# Patient Record
Sex: FEMALE | Race: WHITE | NOT HISPANIC OR LATINO | Employment: FULL TIME | URBAN - METROPOLITAN AREA
[De-identification: names, ages, dates, MRNs, and addresses within clinical notes are randomized per-mention and may not be internally consistent; named-entity substitution may affect disease eponyms.]

---
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
## 2018-05-30 ENCOUNTER — RESULTS ONLY (OUTPATIENT)
Dept: OTHER | Facility: CLINIC | Age: 18
End: 2018-05-30

## 2018-05-30 ENCOUNTER — OFFICE VISIT (OUTPATIENT)
Dept: RHEUMATOLOGY | Facility: CLINIC | Age: 18
End: 2018-05-30
Attending: PEDIATRICS
Payer: COMMERCIAL

## 2018-05-30 VITALS
SYSTOLIC BLOOD PRESSURE: 124 MMHG | HEIGHT: 66 IN | DIASTOLIC BLOOD PRESSURE: 92 MMHG | BODY MASS INDEX: 22.36 KG/M2 | TEMPERATURE: 98.3 F | WEIGHT: 139.11 LBS | HEART RATE: 69 BPM

## 2018-05-30 DIAGNOSIS — M35.1 MCTD (MIXED CONNECTIVE TISSUE DISEASE) (H): Primary | ICD-10-CM

## 2018-05-30 LAB
ALBUMIN SERPL-MCNC: 3.5 G/DL (ref 3.4–5)
ALBUMIN UR-MCNC: NEGATIVE MG/DL
ALP SERPL-CCNC: 45 U/L (ref 40–150)
ALT SERPL W P-5'-P-CCNC: 15 U/L (ref 0–50)
APPEARANCE UR: CLEAR
AST SERPL W P-5'-P-CCNC: 21 U/L (ref 0–35)
BACTERIA #/AREA URNS HPF: ABNORMAL /HPF
BASOPHILS # BLD AUTO: 0 10E9/L (ref 0–0.2)
BASOPHILS NFR BLD AUTO: 0.2 %
BILIRUB DIRECT SERPL-MCNC: <0.1 MG/DL (ref 0–0.2)
BILIRUB SERPL-MCNC: 0.4 MG/DL (ref 0.2–1.3)
BILIRUB UR QL STRIP: NEGATIVE
COLOR UR AUTO: YELLOW
CREAT SERPL-MCNC: 0.73 MG/DL (ref 0.5–1)
CRP SERPL-MCNC: <2.9 MG/L (ref 0–8)
DIFFERENTIAL METHOD BLD: NORMAL
EOSINOPHIL # BLD AUTO: 0.1 10E9/L (ref 0–0.7)
EOSINOPHIL NFR BLD AUTO: 0.9 %
ERYTHROCYTE [DISTWIDTH] IN BLOOD BY AUTOMATED COUNT: 12.9 % (ref 10–15)
ERYTHROCYTE [SEDIMENTATION RATE] IN BLOOD BY WESTERGREN METHOD: 7 MM/H (ref 0–20)
GFR SERPL CREATININE-BSD FRML MDRD: >90 ML/MIN/1.7M2
GLUCOSE SERPL-MCNC: 88 MG/DL (ref 70–99)
GLUCOSE UR STRIP-MCNC: NEGATIVE MG/DL
HBA1C MFR BLD: 5.3 % (ref 0–5.6)
HCT VFR BLD AUTO: 39 % (ref 35–47)
HGB BLD-MCNC: 12.8 G/DL (ref 11.7–15.7)
HGB UR QL STRIP: ABNORMAL
IMM GRANULOCYTES # BLD: 0 10E9/L (ref 0–0.4)
IMM GRANULOCYTES NFR BLD: 0.1 %
KETONES UR STRIP-MCNC: NEGATIVE MG/DL
LEUKOCYTE ESTERASE UR QL STRIP: NEGATIVE
LYMPHOCYTES # BLD AUTO: 2.2 10E9/L (ref 0.8–5.3)
LYMPHOCYTES NFR BLD AUTO: 26.8 %
MCH RBC QN AUTO: 27.9 PG (ref 26.5–33)
MCHC RBC AUTO-ENTMCNC: 32.8 G/DL (ref 31.5–36.5)
MCV RBC AUTO: 85 FL (ref 78–100)
MONOCYTES # BLD AUTO: 0.4 10E9/L (ref 0–1.3)
MONOCYTES NFR BLD AUTO: 4.6 %
MUCOUS THREADS #/AREA URNS LPF: PRESENT /LPF
NEUTROPHILS # BLD AUTO: 5.4 10E9/L (ref 1.6–8.3)
NEUTROPHILS NFR BLD AUTO: 67.4 %
NITRATE UR QL: NEGATIVE
NRBC # BLD AUTO: 0 10*3/UL
NRBC BLD AUTO-RTO: 0 /100
PH UR STRIP: 5.5 PH (ref 5–7)
PLATELET # BLD AUTO: 291 10E9/L (ref 150–450)
PROT SERPL-MCNC: 7.1 G/DL (ref 6.8–8.8)
RBC # BLD AUTO: 4.58 10E12/L (ref 3.8–5.2)
RBC #/AREA URNS AUTO: 4 /HPF (ref 0–2)
SOURCE: ABNORMAL
SP GR UR STRIP: 1.02 (ref 1–1.03)
SQUAMOUS #/AREA URNS AUTO: 1 /HPF (ref 0–1)
TSH SERPL DL<=0.005 MIU/L-ACNC: 1.24 MU/L (ref 0.4–4)
UROBILINOGEN UR STRIP-MCNC: NORMAL MG/DL (ref 0–2)
WBC # BLD AUTO: 8 10E9/L (ref 4–11)
WBC #/AREA URNS AUTO: 1 /HPF (ref 0–5)

## 2018-05-30 PROCEDURE — 85652 RBC SED RATE AUTOMATED: CPT | Performed by: PEDIATRICS

## 2018-05-30 PROCEDURE — 86235 NUCLEAR ANTIGEN ANTIBODY: CPT | Performed by: PEDIATRICS

## 2018-05-30 PROCEDURE — 81375 HLA II TYPING AG EQUIV LR: CPT | Performed by: PEDIATRICS

## 2018-05-30 PROCEDURE — 86039 ANTINUCLEAR ANTIBODIES (ANA): CPT | Performed by: PEDIATRICS

## 2018-05-30 PROCEDURE — 82947 ASSAY GLUCOSE BLOOD QUANT: CPT | Performed by: PEDIATRICS

## 2018-05-30 PROCEDURE — 86140 C-REACTIVE PROTEIN: CPT | Performed by: PEDIATRICS

## 2018-05-30 PROCEDURE — 80076 HEPATIC FUNCTION PANEL: CPT | Performed by: PEDIATRICS

## 2018-05-30 PROCEDURE — 86800 THYROGLOBULIN ANTIBODY: CPT | Performed by: PEDIATRICS

## 2018-05-30 PROCEDURE — 81376 HLA II TYPING 1 LOCUS LR: CPT | Mod: XU | Performed by: PEDIATRICS

## 2018-05-30 PROCEDURE — 86160 COMPLEMENT ANTIGEN: CPT | Performed by: PEDIATRICS

## 2018-05-30 PROCEDURE — 86038 ANTINUCLEAR ANTIBODIES: CPT | Performed by: PEDIATRICS

## 2018-05-30 PROCEDURE — 86225 DNA ANTIBODY NATIVE: CPT | Performed by: PEDIATRICS

## 2018-05-30 PROCEDURE — 82565 ASSAY OF CREATININE: CPT | Performed by: PEDIATRICS

## 2018-05-30 PROCEDURE — 86376 MICROSOMAL ANTIBODY EACH: CPT | Performed by: PEDIATRICS

## 2018-05-30 PROCEDURE — 81001 URINALYSIS AUTO W/SCOPE: CPT | Performed by: PEDIATRICS

## 2018-05-30 PROCEDURE — 85025 COMPLETE CBC W/AUTO DIFF WBC: CPT | Performed by: PEDIATRICS

## 2018-05-30 PROCEDURE — G0463 HOSPITAL OUTPT CLINIC VISIT: HCPCS | Mod: ZF

## 2018-05-30 PROCEDURE — 36415 COLL VENOUS BLD VENIPUNCTURE: CPT | Performed by: PEDIATRICS

## 2018-05-30 PROCEDURE — 84443 ASSAY THYROID STIM HORMONE: CPT | Performed by: PEDIATRICS

## 2018-05-30 PROCEDURE — 83036 HEMOGLOBIN GLYCOSYLATED A1C: CPT | Performed by: PEDIATRICS

## 2018-05-30 PROCEDURE — 82784 ASSAY IGA/IGD/IGG/IGM EACH: CPT | Performed by: PEDIATRICS

## 2018-05-30 ASSESSMENT — PAIN SCALES - GENERAL: PAINLEVEL: NO PAIN (0)

## 2018-05-30 NOTE — LETTER
5/30/2018      RE: Meg Shepherd  [address] [facility] [address]           Problem list:     Patient Active Problem List    Diagnosis Date Noted     Superior mesenteric artery syndrome (H) 12/23/2015     MCTD (mixed connective tissue disease) (H) 04/15/2015          Allergies:     No Known Allergies          Medications:     As of completion of this visit:  Current Outpatient Prescriptions   Medication Sig Dispense Refill     clindamycin (CLINDAMAX) 1 % gel Apply topically daily       Tazarotene 0.05 % CREA        Ferrous Sulfate (IRON SUPPLEMENT PO)              Subjective:     Meg is a 17 year old female who was seen in Pediatric Rheumatology clinic today for follow up.  Meg was last seen in our clinic on 12/27/2017 and returns today accompanied by her mother.  The encounter diagnosis was MCTD (mixed connective tissue disease) (H).      Meg went off her hydroxychloroquine as planned.  About 1 month later while on a vacation she developed extensive rash on her upper chest and arms in a photosensitive distribution.  It was not terribly itchy but it was uncomfortable.  It resolved without any major intervention.  She then had about 3 more illnesses over the course of the semester and she really had a hard time recovering from them, not handling the way other people around her with similar illnesses did.  The good news is however that her long-standing difficulty with warts, for which she has had numerous treatments, have completely resolved.    She recently had some difficulty with frequent urination, without necessarily excessive thirst, and without urinary symptoms.  She does not have a history of diabetes.  She has not had other symptoms that suggest evolving autoimmunity, such as GI symptoms or temperature instability to suggest autoimmune thyroid disease.  Comprehensive Review of Systems is otherwise negative.    The past semester went well.  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    Information per our standardized questionnaire is as below:  Last Exam  Last Eye Exam: 08/06/17  Last Radiograph : 03/12/15  Self Report  Patient Pain Status: No Pain   Patient Global Assessment Of Disease Activity: Very Good  Score Reported By: Self  Arthritis History  Morning stiffness in the past week: None  Has your arthritis stopped from trying any athletic or rigorous activities, or interfaced with your ability to do these activities: No  Have you been limited your ability to do normal daily activities in the past week: No  Did you needed help from other people to do normal activities in the past week: No  Have you used any aids or devices to help you do normal daily activities in the past week: No  Important Medical Events  Hospitalized Since Last Visit: No         Examination:     Blood pressure (!) 124/92, pulse 69, temperature 98.3  F (36.8  C), temperature source Oral, height 5' 5.51\" (166.4 cm), weight 139 lb 1.8 oz (63.1 kg).  Meg appears generally well and in good spirits.  Head: Normal head and hair.  Eyes: No scleral injection, pupils normal.  Ears: Normal external structures, tympanic membranes.  Nose: No cartilage deformity, congestion.  Mouth: Normal teeth, gums, tongue, mucosa.  Throat: Normal, without erythema or exudate.  Neck: Normal, without thyromegaly  Nodes: No cervical, supraclavicular, axillary, inguinal adenopathy.  Lungs: Normal effort, clear to ausculation.  Heart: Regular rate and rhythm, S1 and S2, no murmurs; normal peripheral pulses and perfusion.  Abdomen: Soft, non-tender, without hepatomegaly, splenomegaly, or masses.  Skin: No inflammatory lesions, only normal scratches and bruises.  Nails: No pitting, infection.  Neurological: Alert, appropriately interactive, normal cranial nerves, no deficits, normal gait.  Musculoskeletal: No evidence of current synovitis of the cervical spine, TMJ, sternoclavicular, " acromioclavicular, glenohumeral, elbow, wrist, finger, lumbar spine, sacroiliac, hip, knee, ankle, or toe joints. No tendonitis or bursitis. No enthesitis.          Assessment:     History of mixed connective tissue disease, with elevated RNP antibody, serum IgG, and borderline low complement values.  She recently had difficulty with a photosensitive eruption but whether this is related to an underlying rheumatic condition or might represent a separate problem such as polymorphous light eruption is currently unclear.  She is done remarkably well off hydroxychloroquine.  I cannot explain why she would have more trouble with infectious illnesses and have improvement in her warts while going off hydroxychloroquine.  We do know that individuals with rheumatic diseases have impaired immune function, and that they actually do get more infections, but in her case she has had improvement in handling a viral process (warts) which might suggest better immune function, and I cannot reconcile that with the other illnesses she is experienced.    We reviewed that individuals prone to rheumatic diseases are also prone to autoimmune thyroid disease, type 1 diabetes and celiac disease and there is is evolving evidence that all individuals should have baseline screening for this.  In the case of celiac disease the preferred screening for these patients is actually looking for genetic risk and then if the testing is negative not pursuing any serologic testing.  Change Since Last Visit: Same  ACR Functional Class: Normal  Provider Global Assessment Of Disease Activity: Inactive (0)  (This is measured on the scale of 0 - 10)  On Medication For Treatment Of PUJA?: No  Normal ESR: Normal, or abnormality not due to PUJA  Normal CRP: Normal, or abnormality not due to PUJA  GALA Status: Positive  Rheumatoid Factor Status: Negative     In Compliance With Screening Interval For PUJA: Yes         Plan:     1. Laboratory monitoring today to follow up  on the above concerns.  2. No imaging is needed today.  3. She probably does not need follow-up eye exams at this point if she stays off the hydroxychloroquine, other than routine care like other adults.  4. Follow-up in 12 months for a final check if things are seemingly going well.  If there are any concerns about today's labs or new problems develop we may need to see her sooner.  She has a recurrence of a photosensitive eruption she may need to see dermatology.     If there are any new questions or concerns, I would be glad to help and can be reached through our main office at [phone] or our paging  at [phone].    Jacob Duque MD         Addendum:  Laboratory Investigations:     Results for orders placed or performed in visit on 05/30/18 (from the past 48 hour(s))   Routine UA with microscopic   Result Value Ref Range    Color Urine Yellow     Appearance Urine Clear     Glucose Urine Negative NEG^Negative mg/dL    Bilirubin Urine Negative NEG^Negative    Ketones Urine Negative NEG^Negative mg/dL    Specific Gravity Urine 1.023 1.003 - 1.035    Blood Urine Trace (A) NEG^Negative    pH Urine 5.5 5.0 - 7.0 pH    Protein Albumin Urine Negative NEG^Negative mg/dL    Urobilinogen mg/dL Normal 0.0 - 2.0 mg/dL    Nitrite Urine Negative NEG^Negative    Leukocyte Esterase Urine Negative NEG^Negative    Source Midstream Urine     WBC Urine 1 0 - 5 /HPF    RBC Urine 4 (H) 0 - 2 /HPF    Bacteria Urine Few (A) NEG^Negative /HPF    Squamous Epithelial /HPF Urine 1 0 - 1 /HPF    Mucous Urine Present (A) NEG^Negative /LPF   CBC with platelets differential   Result Value Ref Range    WBC 8.0 4.0 - 11.0 10e9/L    RBC Count 4.58 3.8 - 5.2 10e12/L    Hemoglobin 12.8 11.7 - 15.7 g/dL    Hematocrit 39.0 35.0 - 47.0 %    MCV 85 78 - 100 fl    MCH 27.9 26.5 - 33.0 pg    MCHC 32.8 31.5 - 36.5 g/dL    RDW 12.9 10.0 - 15.0 %    Platelet Count 291 150 - 450 10e9/L    Diff Method Automated Method     % Neutrophils 67.4  %    % Lymphocytes 26.8 %    % Monocytes 4.6 %    % Eosinophils 0.9 %    % Basophils 0.2 %    % Immature Granulocytes 0.1 %    Nucleated RBCs 0 0 /100    Absolute Neutrophil 5.4 1.6 - 8.3 10e9/L    Absolute Lymphocytes 2.2 0.8 - 5.3 10e9/L    Absolute Monocytes 0.4 0.0 - 1.3 10e9/L    Absolute Eosinophils 0.1 0.0 - 0.7 10e9/L    Absolute Basophils 0.0 0.0 - 0.2 10e9/L    Abs Immature Granulocytes 0.0 0 - 0.4 10e9/L    Absolute Nucleated RBC 0.0    Creatinine   Result Value Ref Range    Creatinine 0.73 0.50 - 1.00 mg/dL    GFR Estimate >90 >60 mL/min/1.7m2    GFR Estimate If Black >90 >60 mL/min/1.7m2   CRP inflammation   Result Value Ref Range    CRP Inflammation <2.9 0.0 - 8.0 mg/L   RBC  Sed Rate   Result Value Ref Range    Sed Rate 7 0 - 20 mm/h   Hepatic panel   Result Value Ref Range    Bilirubin Direct <0.1 0.0 - 0.2 mg/dL    Bilirubin Total 0.4 0.2 - 1.3 mg/dL    Albumin 3.5 3.4 - 5.0 g/dL    Protein Total 7.1 6.8 - 8.8 g/dL    Alkaline Phosphatase 45 40 - 150 U/L    ALT 15 0 - 50 U/L    AST 21 0 - 35 U/L   HLA DQB1 for Celiac Disease   Result Value Ref Range    HLA DQB1 for Celiac Disease       Specimen received - Immunology report to follow upon completion.   TSH with free T4 reflex   Result Value Ref Range    TSH 1.24 0.40 - 4.00 mU/L   Hemoglobin A1c   Result Value Ref Range    Hemoglobin A1C 5.3 0 - 5.6 %   Glucose   Result Value Ref Range    Glucose 88 70 - 99 mg/dL   These preliminary results are reassuring.          Jacob Duque MD    CC  Patient Care Team:  Sabine Inman MD as PCP - General  Jacob Hernández MD as MD (Ophthalmology)    Copy to patient  Maribel Shepherd MARC  [address] [facility] [id number]

## 2018-05-30 NOTE — NURSING NOTE
"Chief Complaint   Patient presents with     Follow Up For     Mixed connective tissue disease   BP (!) 124/92  Pulse 69  Temp 98.3  F (36.8  C) (Oral)  Ht 5' 5.51\" (166.4 cm)  Wt 139 lb 1.8 oz (63.1 kg)  BMI 22.79 kg/m2    PT. DECLINED LMX    Olga Garcia CMA    "

## 2018-05-30 NOTE — PATIENT INSTRUCTIONS
[facility] Pediatric Rheumatology    For Help:  The Pediatric Call Center at [phone] can help with scheduling of routine follow up visits.  Alejandra Jason and Mela Van are the Nurse Coordinators for the Division of Pediatric Rheumatology and can be reached directly at [phone]. They can help with questions about your child s rheumatic condition, medications, and test results.   Please try to schedule infusions 3 months in advance.  Please try to give us 72 hours or longer notice if you need to cancel infusions so other patients can benefit from this opening).  Note: Insurance authorization must be obtained before any infusion can be scheduled. If you change health insurance, you must notify our office as soon as possible, so that the infusion can be reauthorized.    For emergencies after hours or on the weekends, please call the page  at [phone] and ask to speak to the physician on-call for Pediatric Rheumatology. Please do not use [employer] for urgent requests.  Main  Services:  [phone]  o Hmong/Jamaican/Pashto: [phone]  o Australian: [phone]  o Lithuanian: [phone]

## 2018-05-30 NOTE — PROGRESS NOTES
Problem list:     Patient Active Problem List    Diagnosis Date Noted     Superior mesenteric artery syndrome (H) 12/23/2015     MCTD (mixed connective tissue disease) (H) 04/15/2015          Allergies:     No Known Allergies          Medications:     As of completion of this visit:  Current Outpatient Prescriptions   Medication Sig Dispense Refill     clindamycin (CLINDAMAX) 1 % gel Apply topically daily       Tazarotene 0.05 % CREA        Ferrous Sulfate (IRON SUPPLEMENT PO)              Subjective:     Meg is a 17 year old female who was seen in Pediatric Rheumatology clinic today for follow up.  Meg was last seen in our clinic on 12/27/2017 and returns today accompanied by her mother.  The encounter diagnosis was MCTD (mixed connective tissue disease) (H).      Meg went off her hydroxychloroquine as planned.  About 1 month later while on a vacation she developed extensive rash on her upper chest and arms in a photosensitive distribution.  It was not terribly itchy but it was uncomfortable.  It resolved without any major intervention.  She then had about 3 more illnesses over the course of the semester and she really had a hard time recovering from them, not handling the way other people around her with similar illnesses did.  The good news is however that her long-standing difficulty with warts, for which she has had numerous treatments, have completely resolved.    She recently had some difficulty with frequent urination, without necessarily excessive thirst, and without urinary symptoms.  She does not have a history of diabetes.  She has not had other symptoms that suggest evolving autoimmunity, such as GI symptoms or temperature instability to suggest autoimmune thyroid disease.  Comprehensive Review of Systems is otherwise negative.    The past semester went well.  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    Information per our standardized  "questionnaire is as below:  Last Exam  Last Eye Exam: 08/06/17  Last Radiograph : 03/12/15  Self Report  Patient Pain Status: No Pain   Patient Global Assessment Of Disease Activity: Very Good  Score Reported By: Self  Arthritis History  Morning stiffness in the past week: None  Has your arthritis stopped from trying any athletic or rigorous activities, or interfaced with your ability to do these activities: No  Have you been limited your ability to do normal daily activities in the past week: No  Did you needed help from other people to do normal activities in the past week: No  Have you used any aids or devices to help you do normal daily activities in the past week: No  Important Medical Events  Hospitalized Since Last Visit: No         Examination:     Blood pressure (!) 124/92, pulse 69, temperature 98.3  F (36.8  C), temperature source Oral, height 5' 5.51\" (166.4 cm), weight 139 lb 1.8 oz (63.1 kg).  Meg appears generally well and in good spirits.  Head: Normal head and hair.  Eyes: No scleral injection, pupils normal.  Ears: Normal external structures, tympanic membranes.  Nose: No cartilage deformity, congestion.  Mouth: Normal teeth, gums, tongue, mucosa.  Throat: Normal, without erythema or exudate.  Neck: Normal, without thyromegaly  Nodes: No cervical, supraclavicular, axillary, inguinal adenopathy.  Lungs: Normal effort, clear to ausculation.  Heart: Regular rate and rhythm, S1 and S2, no murmurs; normal peripheral pulses and perfusion.  Abdomen: Soft, non-tender, without hepatomegaly, splenomegaly, or masses.  Skin: No inflammatory lesions, only normal scratches and bruises.  Nails: No pitting, infection.  Neurological: Alert, appropriately interactive, normal cranial nerves, no deficits, normal gait.  Musculoskeletal: No evidence of current synovitis of the cervical spine, TMJ, sternoclavicular, acromioclavicular, glenohumeral, elbow, wrist, finger, lumbar spine, sacroiliac, hip, knee, ankle, or " toe joints. No tendonitis or bursitis. No enthesitis.          Assessment:     History of mixed connective tissue disease, with elevated RNP antibody, serum IgG, and borderline low complement values.  She recently had difficulty with a photosensitive eruption but whether this is related to an underlying rheumatic condition or might represent a separate problem such as polymorphous light eruption is currently unclear.  She is done remarkably well off hydroxychloroquine.  I cannot explain why she would have more trouble with infectious illnesses and have improvement in her warts while going off hydroxychloroquine.  We do know that individuals with rheumatic diseases have impaired immune function, and that they actually do get more infections, but in her case she has had improvement in handling a viral process (warts) which might suggest better immune function, and I cannot reconcile that with the other illnesses she is experienced.    We reviewed that individuals prone to rheumatic diseases are also prone to autoimmune thyroid disease, type 1 diabetes and celiac disease and there is is evolving evidence that all individuals should have baseline screening for this.  In the case of celiac disease the preferred screening for these patients is actually looking for genetic risk and then if the testing is negative not pursuing any serologic testing.  Change Since Last Visit: Same  ACR Functional Class: Normal  Provider Global Assessment Of Disease Activity: Inactive (0)  (This is measured on the scale of 0 - 10)  On Medication For Treatment Of PUJA?: No  Normal ESR: Normal, or abnormality not due to PUJA  Normal CRP: Normal, or abnormality not due to PUJA  GALA Status: Positive  Rheumatoid Factor Status: Negative     In Compliance With Screening Interval For PUJA: Yes         Plan:     1. Laboratory monitoring today to follow up on the above concerns.  2. No imaging is needed today.  3. She probably does not need follow-up eye  exams at this point if she stays off the hydroxychloroquine, other than routine care like other adults.  4. Follow-up in 12 months for a final check if things are seemingly going well.  If there are any concerns about today's labs or new problems develop we may need to see her sooner.  She has a recurrence of a photosensitive eruption she may need to see dermatology.     If there are any new questions or concerns, I would be glad to help and can be reached through our main office at [phone] or our paging  at [phone].    Jacob Duque MD         Addendum:  Laboratory Investigations:     Results for orders placed or performed in visit on 05/30/18 (from the past 48 hour(s))   Routine UA with microscopic   Result Value Ref Range    Color Urine Yellow     Appearance Urine Clear     Glucose Urine Negative NEG^Negative mg/dL    Bilirubin Urine Negative NEG^Negative    Ketones Urine Negative NEG^Negative mg/dL    Specific Gravity Urine 1.023 1.003 - 1.035    Blood Urine Trace (A) NEG^Negative    pH Urine 5.5 5.0 - 7.0 pH    Protein Albumin Urine Negative NEG^Negative mg/dL    Urobilinogen mg/dL Normal 0.0 - 2.0 mg/dL    Nitrite Urine Negative NEG^Negative    Leukocyte Esterase Urine Negative NEG^Negative    Source Midstream Urine     WBC Urine 1 0 - 5 /HPF    RBC Urine 4 (H) 0 - 2 /HPF    Bacteria Urine Few (A) NEG^Negative /HPF    Squamous Epithelial /HPF Urine 1 0 - 1 /HPF    Mucous Urine Present (A) NEG^Negative /LPF   CBC with platelets differential   Result Value Ref Range    WBC 8.0 4.0 - 11.0 10e9/L    RBC Count 4.58 3.8 - 5.2 10e12/L    Hemoglobin 12.8 11.7 - 15.7 g/dL    Hematocrit 39.0 35.0 - 47.0 %    MCV 85 78 - 100 fl    MCH 27.9 26.5 - 33.0 pg    MCHC 32.8 31.5 - 36.5 g/dL    RDW 12.9 10.0 - 15.0 %    Platelet Count 291 150 - 450 10e9/L    Diff Method Automated Method     % Neutrophils 67.4 %    % Lymphocytes 26.8 %    % Monocytes 4.6 %    % Eosinophils 0.9 %    % Basophils 0.2 %    %  Immature Granulocytes 0.1 %    Nucleated RBCs 0 0 /100    Absolute Neutrophil 5.4 1.6 - 8.3 10e9/L    Absolute Lymphocytes 2.2 0.8 - 5.3 10e9/L    Absolute Monocytes 0.4 0.0 - 1.3 10e9/L    Absolute Eosinophils 0.1 0.0 - 0.7 10e9/L    Absolute Basophils 0.0 0.0 - 0.2 10e9/L    Abs Immature Granulocytes 0.0 0 - 0.4 10e9/L    Absolute Nucleated RBC 0.0    Creatinine   Result Value Ref Range    Creatinine 0.73 0.50 - 1.00 mg/dL    GFR Estimate >90 >60 mL/min/1.7m2    GFR Estimate If Black >90 >60 mL/min/1.7m2   CRP inflammation   Result Value Ref Range    CRP Inflammation <2.9 0.0 - 8.0 mg/L   RBC  Sed Rate   Result Value Ref Range    Sed Rate 7 0 - 20 mm/h   Hepatic panel   Result Value Ref Range    Bilirubin Direct <0.1 0.0 - 0.2 mg/dL    Bilirubin Total 0.4 0.2 - 1.3 mg/dL    Albumin 3.5 3.4 - 5.0 g/dL    Protein Total 7.1 6.8 - 8.8 g/dL    Alkaline Phosphatase 45 40 - 150 U/L    ALT 15 0 - 50 U/L    AST 21 0 - 35 U/L   HLA DQB1 for Celiac Disease   Result Value Ref Range    HLA DQB1 for Celiac Disease       Specimen received - Immunology report to follow upon completion.   TSH with free T4 reflex   Result Value Ref Range    TSH 1.24 0.40 - 4.00 mU/L   Hemoglobin A1c   Result Value Ref Range    Hemoglobin A1C 5.3 0 - 5.6 %   Glucose   Result Value Ref Range    Glucose 88 70 - 99 mg/dL   These preliminary results are reassuring.              CC  Patient Care Team:  Sabine Dickson MD as PCP - General  Jacob Hernández MD as MD (Ophthalmology)  Jacob Duque MD as MD (Pediatrics)  Sabine Dickson MD as Referring Physician (Pediatrics)  SABINE DICKSON    Copy to patient  Maribel Shepherd MARC  [address] [facility] [id number]

## 2018-05-30 NOTE — MR AVS SNAPSHOT
After Visit Summary   5/30/2018    Meg Shepherd    MRN: [id number]           Patient Information     Date Of Birth          2000        Visit Information        Provider Department      5/30/2018 3:40 PM Jacob Duque MD Peds Rheumatology        Today's Diagnoses     MCTD (mixed connective tissue disease) (H)    -  1      Care Instructions      [facility]    For Help:  The Pediatric Call Center at [phone] can help with scheduling of routine follow up visits.  Alejandra Jason and Mela Van are the Nurse Coordinators for the Division of Pediatric Rheumatology and can be reached directly at [phone]. They can help with questions about your child s rheumatic condition, medications, and test results.   Please try to schedule infusions 3 months in advance.  Please try to give us 72 hours or longer notice if you need to cancel infusions so other patients can benefit from this opening).  Note: Insurance authorization must be obtained before any infusion can be scheduled. If you change health insurance, you must notify our office as soon as possible, so that the infusion can be reauthorized.    For emergencies after hours or on the weekends, please call the page  at [phone] and ask to speak to the physician on-call for Pediatric Rheumatology. Please do not use [facility] for urgent requests.  Main  Services:  [phone]  o Hmong/Libyan/Azeri: [phone]  o Tanzanian: [phone]  o Hungarian: [phone]            Follow-ups after your visit        Follow-up notes from your care team     Return in about 1 year (around 5/30/2019) for Routine Visit.      Who to contact     Please call your clinic at [phone] to:    Ask questions about your health    Make or cancel appointments    Discuss your medicines    Learn about your test results    Speak to your doctor            Additional Information About Your Visit        [employer]acet  "Information     Xl[employer]ena is an electronic gateway that provides easy, online access to your medical records. With X[employer]umena, you can request a clinic appointment, read your test results, renew a prescription or communicate with your care team.     To sign up for [employer]ena visit the website at [email or web address]   You will be asked to enter the access code listed below, as well as some personal information. Please follow the directions to create your username and password.     Your access code is: [email or web address]  Expires: 2018  4:13 PM     Your access code will  in 90 days. If you need help or a new code, please contact your [facility] or call [phone] for assistance.        Care EveryWhere ID     This is your Care EveryWhere ID. This could be used by other organizations to access your [place] medical records  [id number]        Your Vitals Were     Pulse Temperature Height BMI (Body Mass Index)          69 98.3  F (36.8  C) (Oral) 5' 5.51\" (166.4 cm) 22.79 kg/m2         Blood Pressure from Last 3 Encounters:   18 (!) 124/92   17 115/82   17 110/79    Weight from Last 3 Encounters:   18 139 lb 1.8 oz (63.1 kg) (68 %)*   17 139 lb 5.3 oz (63.2 kg) (69 %)*   17 136 lb 14.5 oz (62.1 kg) (68 %)*     * Growth percentiles are based on CDC 2-20 Years data.              We Performed the Following     Anti Nuclear Charo IgG by IFA with Reflex     Anti thyroglobulin antibody     CBC with platelets differential     Complement C3     Complement C4     Creatinine     CRP inflammation     DNA double stranded antibodies     HORACIO antibody panel     Glucose     Hemoglobin A1c     Hepatic panel     HLA DQB1 for Celiac Disease     IgG     RBC  Sed Rate     Routine UA with microscopic     Thyroid peroxidase antibody     TSH with free T4 reflex        Primary Care Provider Office Phone # Fax #    Sabine Inman [phone]       [facility] [address]  [place] [address]        Equal Access to Services     EMMYRADHIKA RAMSEY : Hadii aad ku hadritasheila Somiguel, wacatherineda santosadaha, qaybta kavithajustynaalba york, bradley kimyungwhit briones. So [facility] [phone].    ATENCIÓN: Si habla español, tiene a pacheco disposición servicios gratuitos de asistencia lingüística. Llame al [phone].    We comply with applicable federal civil rights laws and [place] laws. We do not discriminate on the basis of race, color, national origin, age, disability, sex, sexual orientation, or gender identity.            Thank you!     Thank you for choosing PEDS RHEUMATOLOGY  for your care. Our goal is always to provide you with excellent care. Hearing back from our patients is one way we can continue to improve our services. Please take a few minutes to complete the written survey that you may receive in the mail after your visit with us. Thank you!             Your Updated Medication List - Protect others around you: Learn how to safely use, store and throw away your medicines at www.disposemymeds.org.          This list is accurate as of 5/30/18  4:13 PM.  Always use your most recent med list.                   Brand Name Dispense Instructions for use Diagnosis    clindamycin 1 % topical gel    CLINDAMAX     Apply topically daily        IRON SUPPLEMENT PO           tazarotene 0.05 % Crea cream

## 2018-05-31 LAB
ANA PAT SER IF-IMP: ABNORMAL
ANA SER QL IF: POSITIVE
ANA TITR SER IF: ABNORMAL {TITER}
C3 SERPL-MCNC: 82 MG/DL (ref 76–169)
C4 SERPL-MCNC: 14 MG/DL (ref 15–50)
DSDNA AB SER-ACNC: 1 IU/ML
ENA RNP IGG SER IA-ACNC: <0.2 AI (ref 0–0.9)
ENA SCL70 IGG SER IA-ACNC: <0.2 AI (ref 0–0.9)
ENA SM IGG SER-ACNC: <0.2 AI (ref 0–0.9)
ENA SS-A IGG SER IA-ACNC: 0.6 AI (ref 0–0.9)
ENA SS-B IGG SER IA-ACNC: <0.2 AI (ref 0–0.9)
HLA DQB1 FOR CELIAC DISEASE: NORMAL
IGG SERPL-MCNC: 1080 MG/DL (ref 695–1620)
THYROGLOB AB SERPL IA-ACNC: <20 IU/ML (ref 0–40)
THYROPEROXIDASE AB SERPL-ACNC: 11 IU/ML

## 2018-06-04 LAB
DQA1*LOCUS NMDP: NORMAL
DQA1*LOCUS: NORMAL
DQA1*NMDP: NORMAL
DQB1* LOCUS NMDP: NORMAL
DQB1* LOCUS: NORMAL
DQB1* NMDP: NORMAL
DQB1*: NORMAL
DRSSO COMMENTS: NORMAL
DRSSO TEST METHOD: NORMAL

## 2018-10-26 ENCOUNTER — TELEPHONE (OUTPATIENT)
Dept: RHEUMATOLOGY | Facility: CLINIC | Age: 18
End: 2018-10-26

## 2018-10-26 NOTE — TELEPHONE ENCOUNTER
"Meg is at college, but Mom went and picked her up during the night and brought her home.  Meg has been complaining for about a week that her breathing \"feels weird\", she can feel her heart \"pounding\", and now her chest hurts., She says that she feels weak and her feet are falling asleep. Mom said that Meg look pale and is exhausted.  I advised her to have Meg evaluated by primary MD. She is not currently on any medications for her MCTD.  "

## 2023-09-15 ENCOUNTER — TRANSCRIBE ORDERS (OUTPATIENT)
Dept: OTHER | Age: 23
End: 2023-09-15

## 2023-09-15 DIAGNOSIS — M35.1 MCTD (MIXED CONNECTIVE TISSUE DISEASE) (H): Primary | ICD-10-CM

## 2024-02-02 ENCOUNTER — TELEPHONE (OUTPATIENT)
Dept: RHEUMATOLOGY | Facility: CLINIC | Age: 24
End: 2024-02-02
Payer: COMMERCIAL

## 2024-02-02 NOTE — TELEPHONE ENCOUNTER
LVM to reschedule appt on 6/27/24 with Dr. Villanueva. On HOLD 8/15/24 at 8am and 2pm ok to schedule per Alessandra. If this doesnt work then next avail NP appt. Thank you

## 2024-02-02 NOTE — TELEPHONE ENCOUNTER
LVM explaining there was a miscommunication about the Dr. Espinosa schedule. There is NO need for a reschedule. The appt on 6/27/24 is still good. Sent out appt letter

## 2024-06-26 NOTE — PROGRESS NOTES
Rheumatology Clinic Visit  [facility]  Bob Villanueva M.D.     Meg Shepherd MRN# [id number]   YOB: 2000 Age: 24 year old   Date of Visit: 06/27/2024  Primary care provider: Sabine Inman          Assessment and Plan:     #  Mixed connective tissue disease (onset ~2015 with joint pain, fatigue, gastrointestinal symptoms, photosensitive skin rash, foot Raynaud's, high positive GALA, positive RNP, treatment hydroxychloroquine, prednisone, and methotrexate from 2016 through 2017)  # Recurrent finger and hand rashes, query eczema  # Raynaud's phenomenon, feet    Data: In May 2018, antinuclear antibody positive at 1: 1280; complement C4 was slightly low at 14; complement C3 normal.  Double-stranded DNA undetectable.  Extractable nuclear antigen panel negative.  Thyroid peroxidase antibodies negative.  Imaging: In 2015, x-rays of the knees, pelvis and hips, and shoulders were normal.    Discussion: I agree with 's impression of mixed connective tissue disease with musculoskeletal, cutaneous, gastrointestinal symptoms associated with high positive GALA and positive RNP and hypocomplementemia.  Since the last visit with rheumatology in 2018, patient has had no recurrence of major symptoms that precipitated treatment with corticosteroids and combination hydroxychloroquine and methotrexate back in 2016.  Patient has been off immunomodulatory medication since the last visit.  She still has stable, low-grade Raynaud's without permanent tissue injury in the feet; monthly rashes over the fingers and elbows may represent eczema; these are nonprogressive and are spontaneously remitting, arguing against the notion that they are representative of ongoing connective tissue disease.  Although there are no signs or symptoms that suggest active or progressive autoimmune disease or tissue injury, we discussed that internal organs can be affected in connective tissue disease, and sometimes involvement may  not be accompanied by symptoms.  I recommend screening for renal, hepatic, and bone marrow function with blood work and urinalysis today.  I recommend rechecking complement.  If these screening tests are normal/negative, I recommend starting a annual program for routine monitoring of symptoms and organ function.  Immunomodulatory therapy is not warranted presently.    We discussed in clinic:  Diagnosis:  1.  Mixed connective tissue disease  2.  Recurrent finger and hand rashes, query eczema  3.  Raynaud's phenomenon, feet    Plan:  1.  Use hydrocortisone 1% cream on itchy rash on fingers  2.  If rash not responsive to over-the-counter hydrocortisone, recommend consultation with dermatology  3.  Protect feet as much as possible from cold insults (wearing double socks, battery-powered foot warmers, etc.)  4.  Monitor for joint pain, fatigue, weight change, change in blood pressure (rise in diastolic blood pressure greater than 20 mmHg sustained over several weeks)  5.  If no symptoms or concerns 1 month before next scheduled rheumatology follow-up, contact rheumatology to reschedule for another 6 months out  6.  Check urinalysis, kidney and liver function, immune activation, and blood counts today; screen kidney function, blood counts, urinalysis once annually.    RTC 12 mos    Bob Villanueva MD  Staff Rheumatologist, [facility]    On the day of the encounter, a total of 60 minutes was spent in chart review, and in counseling and coordination of care, regarding the patient's complex medical problem of mixed connective tissue disease, juvenile onset, Raynaud's phenomenon, recurrent pruritic hand rashes.           History of Present Illness:   Meg Shepherd presents for establishment of care for mixed connective tissue disease.    Mixed connective tissue disease, diagnosed in adolescence, with elevated RNP antibody, serum IgG, and borderline low complement values, query photosensitive rash,  started with vomitting,  "fatigue age 11. At 15, she had joint pain, fatigue. She had joint pain in hands, wrists, elbows, shoulder, knees. Initially on prednisone, then added hydroxychloroquine and methotrexate with relief. She tapered off prednisone after 3 mos, and off methotrexate and hydroxychloroquine as of 2017.    Interval history June 27, 2024    Patient was last seen by Dr. Duque in pediatric rheumatology on May 30, 2018.  Impression was of mixed connective tissue disease.  Excerpt of the history obtained at that visit is presented below:    \"...Meg went off her hydroxychloroquine as planned.  About 1 month later while on a vacation she developed extensive rash on her upper chest and arms in a photosensitive distribution.  It was not terribly itchy but it was uncomfortable.  It resolved without any major intervention.  She then had about 3 more illnesses over the course of the semester and she really had a hard time recovering from them, not handling the way other people around her with similar illnesses did.  The good news is however that her long-standing difficulty with warts, for which she has had numerous treatments, have completely resolved.     She recently had some difficulty with frequent urination, without necessarily excessive thirst, and without urinary symptoms.  She does not have a history of diabetes.  She has not had other symptoms that suggest evolving autoimmunity, such as GI symptoms or temperature instability to suggest autoimmune thyroid disease.  Comprehensive Review of Systems is otherwise negative.     The past semester went well.  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  Recommendation was to continue monitoring off hydroxychloroquine.    Today, she reports that her " "goal is to simply establish care with a rheumatologist \"in case something happens\".  She reports that as a teenager in high school, gastrointestinal, musculoskeletal, and skin symptoms were so severe that her activities were markedly limited for a time.  She has had no recurrence of such symptoms since her last visit in pediatric rheumatology, but wants to be sure that she is not missing anything with regard to screening and monitoring.    she notes recurrent rashes on the tops of her feet. Typically itchy, not sun-related.  There are colorless tiny bumps on the sides of her fingers frequently associated with these rashes.  Also rash in cresaes of elbows and on fingers. Rashes on fingers resolve on their own after a week, occurring once monthly--+ itching, with little blisters.    She notes wintertime cold induced color change and painful numbness affecting both feet, principally the toes.  She has not ever had skin breakdown or skin ulcers associated with color change/numbness.  Frequency and severity of color change phenomena has not increased in recent years.    She reports no fevers, chills, sweats, change in appetite, fatigue, joint pain, pleurisy, cough, sore throat, headache, abdominal pain, change in bowel habits, numbness weakness or tingling in arms or legs.  No early morning stiffness, no regular use of OTC medications.    For exercise, she runs/walks several times weekly. No dyspnea on exertion.  She does not perform resistance exercises.  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           Review of Systems:     Constitutional: negative  Skin: in winter, she notes color change in toes, not hands. No ulcers  Eyes: negative  Ears/Nose/Throat: negative  Respiratory: No shortness of breath, dyspnea on exertion, cough, or hemoptysis  Cardiovascular: negative  Gastrointestinal: negative  Genitourinary: " negative  Musculoskeletal: negative  Neurologic: negative  Psychiatric: negative  Hematologic/Lymphatic/Immunologic: negative  Endocrine: negative         Active Problem List:     Patient Active Problem List    Diagnosis Date Noted    Superior mesenteric artery syndrome (H24) 12/23/2015     Priority: Medium    MCTD (mixed connective tissue disease) (H24) 04/15/2015     Priority: Medium            Past Medical History:     Past Medical History:   Diagnosis Date    MCTD (mixed connective tissue disease) (H24) 4/15/2015     No past surgical history on file.         Social History:     Social History     Socioeconomic History    Marital status:      Spouse name: Not on file    Number of children: Not on file    Years of education: Not on file    Highest education level: Not on file   Occupational History    Not on file   Tobacco Use    Smoking status: Never    Smokeless tobacco: Not on file   Substance and Sexual Activity    Alcohol use: Not on file    Drug use: Not on file    Sexual activity: Not on file   Other Topics Concern    Not on file   Social History Narrative    Not on file     Social Determinants of Health     Financial Resource Strain: Low Risk  (9/13/2023)    Received from [facility] [employer] [facility], [facility] & [facility]    Financial Resource Strain     Difficulty of Paying Living Expenses: 3     Difficulty of Paying Living Expenses: Not on file   Food Insecurity: No Food Insecurity (9/13/2023)    [facility], [facility] & [facility]    Food Insecurity     Worried About Running Out of Food in the Last Year: 1   Transportation Needs: No Transportation Needs (9/13/2023)    [facility], [facility]    Transportation Needs     Lack of Transportation (Medical): 1   Physical Activity: Not on file   Stress: Not on file    Social Connections: Socially Integrated (9/13/2023)    [facility], [employer] [facility]    Social Connections     Frequency of Communication with Friends and Family: 0   Interpersonal Safety: Not on file   Housing Stability: Low Risk  (9/13/2023)    Received from [facility], [employer] [facility]    Housing Stability     Unable to Pay for Housing in the Last Year: 1   Drinks EtOH once weekly.  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       Family History:   No family history on file.    Mother has RA, thyroid autoimmunity  Cousins with hyperthyroidism  Sister has Tracy-Danlos  No SLE, MS  No psoriasis or ecxaema       Allergies:   No Known Allergies         Medications:     Current Outpatient Medications   Medication Sig Dispense Refill    escitalopram (LEXAPRO) 5 MG tablet Take 10 mg by mouth daily      norgestimate-ethinyl estradiol (ORTHO-CYCLEN) 0.25-35 MG-MCG tablet Take 1 tablet by mouth daily      clindamycin (CLINDAMAX) 1 % gel Apply topically daily (Patient not taking: Reported on 6/27/2024)      Ferrous Sulfate (IRON SUPPLEMENT PO)  (Patient not taking: Reported on 6/27/2024)      Tazarotene 0.05 % CREA  (Patient not taking: Reported on 6/27/2024)       She is not using clindamycin gel.         Physical Exam:   Blood pressure 119/86, pulse 73, weight 79.8 kg (176 lb), SpO2 99%.  Wt Readings from Last 6 Encounters:   06/27/24 79.8 kg (176 lb)   05/30/18 63.1 kg (139 lb 1.8 oz) (68%, Z= 0.46)*   12/27/17 63.2 kg (139 lb 5.3 oz) (69%, Z= 0.49)*   06/14/17 62.1 kg (136 lb 14.5 oz) (68%, Z= 0.46)*   12/22/16 61.8 kg (136 lb 3.9 oz) (69%, Z= 0.49)*   08/17/16 61.3 kg (135 lb 2.3 oz) (68%, Z= 0.48)*     * Growth percentiles are based on CDC (Girls, 2-20 Years) data.     Body mass index is 28.83 kg/m .  Constitutional: well-developed, appearing stated age;  cooperative  Eyes: nl EOM, PERRLA, vision, conjunctiva, sclera  ENT: nl external ears, nose, hearing, lips, teeth, gums, throat  No mucous membrane lesions, normal saliva pool  Neck: no mass or thyroid enlargement  Resp: Breathing is unlabored  GI: no ABD mass or tenderness  : not tested  Lymph: Firm 1 cm mobile nontender mass at the right angle of the jaw  MS: The TMJ, neck, shoulder, elbow, wrist, MCP/PIP/DIP, spine, hip, knee, ankle, and foot MTP/IP joints were examined and found normal. No active synovitis or altered joint anatomy. Full joint ROM. Normal  strength. No dactylitis,  tenosynovitis, enthesopathy.  Skin: no nail pitting, alopecia, rash, nodules or lesions  Neuro: nl cranial nerves, strength, sensation, DTRs.   Psych: nl judgement, orientation, memory, affect.         Data:     @      Latest Ref Rng & Units 12/22/2016     9:52 AM 6/14/2017     5:10 PM 5/30/2018     4:23 PM   RHEUM RESULTS   Albumin 3.4 - 5.0 g/dL 3.6  3.3  3.5    ALT 0 - 50 U/L 18  18  15    AST 0 - 35 U/L 21  12  21    GLAA interpretation NEG^Negative   Positive    GALA pattern 1    HOMOGENEOUS    GALA titer 1    >1:1,280    Complement C3 76 - 169 mg/dL 90   82    Complement C4 15 - 50 mg/dL 15   14    Creatinine 0.50 - 1.00 mg/dL 0.93  0.76  0.73    CRP Inflammation 0.0 - 8.0 mg/L <2.9  6.5  <2.9    DNA-ds <10 IU/mL 1   1    GFR Estimate If Black >60 mL/min/1.7m2 >90  African American GFR Calc    >90   GFR Calc    >90    GFR Estimate >60 mL/min/1.7m2 78  >90  Non  GFR Calc    >90    Hematocrit 35.0 - 47.0 % 43.6  39.3  39.0    Hemoglobin 11.7 - 15.7 g/dL 14.4  13.5  12.8    IGA 70 - 380 mg/dL 57       - 1,620 mg/dL 1,100  1,040  1,080    WBC 4.0 - 11.0 10e9/L 5.6  9.0  8.0    RBC Count 3.8 - 5.2 10e12/L 4.84  4.47  4.58    RDW 10.0 - 15.0 % 13.9  12.4  12.9    MCHC 31.5 - 36.5 g/dL 33.0  34.4  32.8    MCV 78 - 100 fl 90  88  85    Platelet Count 150 - 450 10e9/L 236  278  291    Sed Rate 0 -  20 mm/h 7  13  7        Rheumatoid Factor   Date Value Ref Range Status   03/12/2015 <20 <20 IU/mL Final   ,  ,   Cyclic Cit Pept IgG/IgA   Date Value Ref Range Status   03/12/2015 <20  Interpretation:  Negative   <20 UNITS Final   ,  ,   Scleroderma Antibody Scl-70 HORACIO IgG   Date Value Ref Range Status   05/30/2018 <0.2 0.0 - 0.9 AI Final     Comment:     Negative  Antibody index (AI) values reflect qualitative changes in antibody   concentration that cannot be directly associated with clinical condition or   disease state.       SSA (Ro) (HORACIO) Antibody, IgG   Date Value Ref Range Status   05/30/2018 0.6 0.0 - 0.9 AI Final     Comment:     Negative  Antibody index (AI) values reflect qualitative changes in antibody   concentration that cannot be directly associated with clinical condition or   disease state.       SSB (La) (HORACIO) Antibody, IgG   Date Value Ref Range Status   05/30/2018 <0.2 0.0 - 0.9 AI Final     Comment:     Negative  Antibody index (AI) values reflect qualitative changes in antibody   concentration that cannot be directly associated with clinical condition or   disease state.     ,   Ribonucleic Protein IgG Antibody   Date Value Ref Range Status   02/22/2012 0  Final     Comment:     Reference range: 0 to 40  Unit: AU/mL  (Note)  INTERPRETIVE INFORMATION: Ribonucleic Protein (HORACIO), IgG   29 AU/mL or Less ............. Negative   30 - 40 AU/mL ................ Equivocal   41 AU/mL or Greater .......... Positive  RNP antibody is seen in % of mixed connective tissue  disease and is considered specific for this syndrome if  other antibodies are negative. RNP is also present in  20-30% of systemic lupus erythematosus (SLE) and 15-25% of  progressive systemic sclerosis (PSS).     Smith Antibody IgG   Date Value Ref Range Status   02/22/2012 0  Final     Comment:     Reference range: 0 to 40  Unit: AU/mL  (Note)  INTERPRETIVE INFORMATION: JUAREZ (HORACIO) Ab, IgG   29 AU/mL or Less .............  Negative   30 - 40 AU/mL ................ Equivocal   41 AU/mL or Greater .......... Positive  Powers antibody is very specific for systemic lupus  erythematosus (SLE) but only occurs in 30-35% of SLE cases.  The presence of antibodies to Powers is often associated  with renal disease.     SSA (RO) Antibody IgG   Date Value Ref Range Status   02/22/2012 25  Final     Comment:     Reference range: 0 to 40  Unit: AU/mL  (Note)  INTERPRETIVE INFORMATION: SSA (Ro) (HORACIO) Ab, IgG   29 AU/mL or Less ............. Negative   30 - 40 AU/mL ................ Equivocal   41 AU/mL or Greater .......... Positive  SSA (Ro) antibody is seen in 70-75% of Sjogren syndrome  cases, 30-40% of systemic lupus erythematosus (SLE) and  5-10% of progressive systemic sclerosis (PSS).     SSB (LA) Antibody IgG   Date Value Ref Range Status   02/22/2012 0  Final     Comment:     Reference range: 0 to 40  Unit: AU/mL  (Note)  INTERPRETIVE INFORMATION: SSB (La) (HORACIO) Ab, IgG   29 AU/mL or Less ............. Negative   30 - 40 AU/mL ................ Equivocal   41 AU/mL or Greater .......... Positive  SSB (La) antibody is seen in 50-60% of Sjogren syndrome  cases and is specific if it is the only HORACIO antibody  present. 15-25% of patients with systemic lupus  erythematosus (SLE) and 5-10% of patients with progressive  systemic sclerosis (PSS) also have this antibody.     Scleroderma Antibody IgG   Date Value Ref Range Status   02/22/2012 19  Final     Comment:     Reference range: 0 to 40  Unit: AU/mL  (Note)  INTERPRETIVE INFORMATION: Scleroderma (Scl-70) (HORACIO) Ab, IgG   29 AU/mL or Less ............. Negative   30 - 40 AU/mL ................ Equivocal   41 AU/mL or Greater .......... Positive  Scleroderma (Scl-70) antibody is seen in 20-60% of patients  with scleroderma and is considered diagnostic and specific  for scleroderma if it is the only HORACIO antibody present.  Scl-70 is also seen in approximately 25% of progressive  systemic sclerosis  (PSS).  Performed by [facility],  [address], [facility] [place] [address] [phone]  [email or web address], Inocencia Juarez MD, Lab. Director   ,   GALA interpretation   Date Value Ref Range Status   05/30/2018 Positive (A) NEG^Negative Final     Comment:                                        Reference range:  <1:40  NEGATIVE  1:40 - 1:80  BORDERLINE POSITIVE  >1:80 POSITIVE       GALA pattern 1   Date Value Ref Range Status   05/30/2018 HOMOGENEOUS  Final     GALA titer 1   Date Value Ref Range Status   05/30/2018 >1:1,280  Final   ,   GALA Screen by EIA   Date Value Ref Range Status   04/25/2016 2.4 (H) <1.0 Final     Comment:     Interpretation:  Positive   ,   DNA-ds   Date Value Ref Range Status   05/30/2018 1 <10 IU/mL Final     Comment:     Negative   ,  ,  ,  ,  ,  ,  ,  ,  ,   Antistreptolysin O   Date Value Ref Range Status   02/22/2012 142 0 - 240 IU/mL Final     Comment:     A single ASO analysis may not be meaningful due to the variability of ASO   values   within the normal population.  Both clinical and laboratory findings should   be   considered in reaching a diagnosis.  A single analysis may be less   informative   than a repeat analysis showing a change.   ,  ,  ,  ,  ,  ,  ,  ,  ,   Neutrophil Cytoplasmic IgG Antibody   Date Value Ref Range Status   09/24/2015   Final    <1:20  Reference range: <1:20  (Note)  The ANCA IFA is <1:20; therefore, no further testing will  be performed.  INTERPRETIVE INFORMATION: Anti-Neutrophil Cyto Ab, IgG  Neutrophil Cytoplasmic Antibodies (C-ANCA = granular  cytoplasmic staining, P-ANCA = perinuclear staining) are  found in the serum of over 90 percent of patients with  certain necrotizing systemic vasculitides, and usually in  less than 5 percent of patients with collagen vascular  disease or arthritis.  Performed by [facility],  [address], [facility] [place] [address] [phone]  [email or web address], Adrian Sykes MD, Lab. Director     ,   Proteinase 3 Antibody IgG    Date Value Ref Range Status   09/24/2015  0.0 - 0.9 AI Final    <0.2  Negative   Antibody index (AI) values reflect qualitative changes in antibody   concentration that cannot be directly associated with clinical condition or   disease state.     ,   Myeloperoxidase Antibody IgG   Date Value Ref Range Status   09/24/2015  0.0 - 0.9 AI Final    <0.2  Negative   Antibody index (AI) values reflect qualitative changes in antibody   concentration that cannot be directly associated with clinical condition or   disease state.     ,  ,  ,  ,  ,  ,  ,  ,  ,   IGG   Date Value Ref Range Status   05/30/2018 1,080 695 - 1,620 mg/dL Final     IGA   Date Value Ref Range Status   12/22/2016 57 (L) 70 - 380 mg/dL Final   ,  ,  ,  ,   Powers HORACIO Antibody IgG   Date Value Ref Range Status   05/30/2018 <0.2 0.0 - 0.9 AI Final     Comment:     Negative  Antibody index (AI) values reflect qualitative changes in antibody   concentration that cannot be directly associated with clinical condition or   disease state.     ,   Scleroderma Antibody Scl-70 HORACIO IgG   Date Value Ref Range Status   05/30/2018 <0.2 0.0 - 0.9 AI Final     Comment:     Negative  Antibody index (AI) values reflect qualitative changes in antibody   concentration that cannot be directly associated with clinical condition or   disease state.     ,  ,

## 2024-06-27 ENCOUNTER — LAB (OUTPATIENT)
Dept: LAB | Facility: CLINIC | Age: 24
End: 2024-06-27
Payer: COMMERCIAL

## 2024-06-27 ENCOUNTER — OFFICE VISIT (OUTPATIENT)
Dept: RHEUMATOLOGY | Facility: CLINIC | Age: 24
End: 2024-06-27
Attending: FAMILY MEDICINE
Payer: COMMERCIAL

## 2024-06-27 VITALS
WEIGHT: 176 LBS | OXYGEN SATURATION: 99 % | DIASTOLIC BLOOD PRESSURE: 86 MMHG | SYSTOLIC BLOOD PRESSURE: 119 MMHG | HEART RATE: 73 BPM | BODY MASS INDEX: 28.83 KG/M2

## 2024-06-27 DIAGNOSIS — M35.1 MCTD (MIXED CONNECTIVE TISSUE DISEASE) (H): ICD-10-CM

## 2024-06-27 LAB
ALBUMIN SERPL BCG-MCNC: 4.3 G/DL (ref 3.5–5.2)
ALBUMIN UR-MCNC: NEGATIVE MG/DL
ALP SERPL-CCNC: 57 U/L (ref 40–150)
ALT SERPL W P-5'-P-CCNC: 8 U/L (ref 0–50)
ANION GAP SERPL CALCULATED.3IONS-SCNC: 10 MMOL/L (ref 7–15)
APPEARANCE UR: CLEAR
AST SERPL W P-5'-P-CCNC: 23 U/L (ref 0–45)
BACTERIA #/AREA URNS HPF: ABNORMAL /HPF
BASOPHILS # BLD AUTO: 0 10E3/UL (ref 0–0.2)
BASOPHILS NFR BLD AUTO: 1 %
BILIRUB SERPL-MCNC: 0.3 MG/DL
BILIRUB UR QL STRIP: NEGATIVE
BUN SERPL-MCNC: 10.9 MG/DL (ref 6–20)
CALCIUM SERPL-MCNC: 9.3 MG/DL (ref 8.6–10)
CHLORIDE SERPL-SCNC: 102 MMOL/L (ref 98–107)
COLOR UR AUTO: YELLOW
CREAT SERPL-MCNC: 0.95 MG/DL (ref 0.51–0.95)
DEPRECATED HCO3 PLAS-SCNC: 26 MMOL/L (ref 22–29)
EGFRCR SERPLBLD CKD-EPI 2021: 84 ML/MIN/1.73M2
EOSINOPHIL # BLD AUTO: 0.1 10E3/UL (ref 0–0.7)
EOSINOPHIL NFR BLD AUTO: 2 %
ERYTHROCYTE [DISTWIDTH] IN BLOOD BY AUTOMATED COUNT: 12.4 % (ref 10–15)
GLUCOSE SERPL-MCNC: 79 MG/DL (ref 70–99)
GLUCOSE UR STRIP-MCNC: NEGATIVE MG/DL
HCT VFR BLD AUTO: 42.9 % (ref 35–47)
HGB BLD-MCNC: 14 G/DL (ref 11.7–15.7)
HGB UR QL STRIP: ABNORMAL
IMM GRANULOCYTES # BLD: 0 10E3/UL
IMM GRANULOCYTES NFR BLD: 0 %
KETONES UR STRIP-MCNC: NEGATIVE MG/DL
LEUKOCYTE ESTERASE UR QL STRIP: NEGATIVE
LYMPHOCYTES # BLD AUTO: 1.7 10E3/UL (ref 0.8–5.3)
LYMPHOCYTES NFR BLD AUTO: 27 %
MCH RBC QN AUTO: 28.5 PG (ref 26.5–33)
MCHC RBC AUTO-ENTMCNC: 32.6 G/DL (ref 31.5–36.5)
MCV RBC AUTO: 87 FL (ref 78–100)
MONOCYTES # BLD AUTO: 0.4 10E3/UL (ref 0–1.3)
MONOCYTES NFR BLD AUTO: 6 %
NEUTROPHILS # BLD AUTO: 4.1 10E3/UL (ref 1.6–8.3)
NEUTROPHILS NFR BLD AUTO: 65 %
NITRATE UR QL: NEGATIVE
PH UR STRIP: 6.5 [PH] (ref 5–7)
PLATELET # BLD AUTO: 127 10E3/UL (ref 150–450)
POTASSIUM SERPL-SCNC: 4.4 MMOL/L (ref 3.4–5.3)
PROT SERPL-MCNC: 7.7 G/DL (ref 6.4–8.3)
RBC # BLD AUTO: 4.91 10E6/UL (ref 3.8–5.2)
RBC #/AREA URNS AUTO: ABNORMAL /HPF
SODIUM SERPL-SCNC: 138 MMOL/L (ref 135–145)
SP GR UR STRIP: 1.02 (ref 1–1.03)
SQUAMOUS #/AREA URNS AUTO: ABNORMAL /LPF
UROBILINOGEN UR STRIP-ACNC: 0.2 E.U./DL
WBC # BLD AUTO: 6.3 10E3/UL (ref 4–11)
WBC #/AREA URNS AUTO: ABNORMAL /HPF

## 2024-06-27 PROCEDURE — 36415 COLL VENOUS BLD VENIPUNCTURE: CPT

## 2024-06-27 PROCEDURE — 85025 COMPLETE CBC W/AUTO DIFF WBC: CPT

## 2024-06-27 PROCEDURE — 86160 COMPLEMENT ANTIGEN: CPT

## 2024-06-27 PROCEDURE — 81001 URINALYSIS AUTO W/SCOPE: CPT

## 2024-06-27 PROCEDURE — 80053 COMPREHEN METABOLIC PANEL: CPT

## 2024-06-27 PROCEDURE — 99205 OFFICE O/P NEW HI 60 MIN: CPT | Performed by: INTERNAL MEDICINE

## 2024-06-27 RX ORDER — NORGESTIMATE AND ETHINYL ESTRADIOL 0.25-0.035
1 KIT ORAL DAILY
COMMUNITY

## 2024-06-27 RX ORDER — ESCITALOPRAM OXALATE 5 MG/1
10 TABLET ORAL DAILY
COMMUNITY

## 2024-06-27 NOTE — NURSING NOTE
Here to establish care, previously seen by pediatric rheum.  Has some finger swelling today.      Dinorha Lane RN

## 2024-06-27 NOTE — PATIENT INSTRUCTIONS
Diagnosis:  1.  Mixed connective tissue disease  2.  Recurrent finger and hand rashes, query eczema  3.  Raynaud's phenomenon, feet    Plan:  1.  Use hydrocortisone 1% cream on itchy rash on fingers  2.  If rash not responsive to over-the-counter hydrocortisone, recommend consultation with dermatology  3.  Protect feet as much as possible from cold insults (wearing double socks, battery-powered foot warmers, etc.)  4.  Monitor for joint pain, fatigue, weight change, change in blood pressure (rise in diastolic blood pressure greater than 20 mmHg sustained over several weeks)  5.  If no symptoms or concerns 1 month before next scheduled rheumatology follow-up, contact rheumatology to reschedule for another 6 months out  6.  Check urinalysis, kidney and liver function, immune activation, and blood counts today; screen kidney function, blood counts, urinalysis once annually.

## 2024-06-28 LAB
C3 SERPL-MCNC: 131 MG/DL (ref 81–157)
C4 SERPL-MCNC: 24 MG/DL (ref 13–39)

## 2024-07-05 ENCOUNTER — TELEPHONE (OUTPATIENT)
Dept: RHEUMATOLOGY | Facility: CLINIC | Age: 24
End: 2024-07-05
Payer: COMMERCIAL

## 2024-07-05 DIAGNOSIS — M35.1 MCTD (MIXED CONNECTIVE TISSUE DISEASE) (H): Primary | ICD-10-CM

## 2024-07-05 NOTE — TELEPHONE ENCOUNTER
----- Message from Bob Villanueva sent at 6/29/2024  5:33 PM CDT -----  Blood counts show very mild low platelet count, significance uncertain.  I recommend repeating CBC with platelets in 1 month.  Kidney function is normal.  Liver function is normal.  Inflammation is low.  Testing for autoimmune activity associated with mixed connective tissue disease (complement C3 and C4) is negative.  Urine shows scant amount of red blood cells, significance uncertain.    No change is needed based upon these results, apart from repeat of the blood counts.  It was a pleasure seeing you in clinic. Please let me know if you have questions.    Sincerely,    Bob Villanueva MD  Rheumatologist, [facility]

## 2024-07-31 ENCOUNTER — LAB (OUTPATIENT)
Dept: LAB | Facility: CLINIC | Age: 24
End: 2024-07-31
Payer: COMMERCIAL

## 2024-07-31 DIAGNOSIS — M35.1 MCTD (MIXED CONNECTIVE TISSUE DISEASE) (H): ICD-10-CM

## 2024-07-31 LAB
BASOPHILS # BLD AUTO: 0 10E3/UL (ref 0–0.2)
BASOPHILS NFR BLD AUTO: 1 %
EOSINOPHIL # BLD AUTO: 0.1 10E3/UL (ref 0–0.7)
EOSINOPHIL NFR BLD AUTO: 1 %
ERYTHROCYTE [DISTWIDTH] IN BLOOD BY AUTOMATED COUNT: 12.6 % (ref 10–15)
HCT VFR BLD AUTO: 42.1 % (ref 35–47)
HGB BLD-MCNC: 13.6 G/DL (ref 11.7–15.7)
IMM GRANULOCYTES # BLD: 0 10E3/UL
IMM GRANULOCYTES NFR BLD: 0 %
LYMPHOCYTES # BLD AUTO: 1.8 10E3/UL (ref 0.8–5.3)
LYMPHOCYTES NFR BLD AUTO: 28 %
MCH RBC QN AUTO: 28.3 PG (ref 26.5–33)
MCHC RBC AUTO-ENTMCNC: 32.3 G/DL (ref 31.5–36.5)
MCV RBC AUTO: 88 FL (ref 78–100)
MONOCYTES # BLD AUTO: 0.3 10E3/UL (ref 0–1.3)
MONOCYTES NFR BLD AUTO: 5 %
NEUTROPHILS # BLD AUTO: 4.3 10E3/UL (ref 1.6–8.3)
NEUTROPHILS NFR BLD AUTO: 66 %
PLATELET # BLD AUTO: 293 10E3/UL (ref 150–450)
RBC # BLD AUTO: 4.81 10E6/UL (ref 3.8–5.2)
WBC # BLD AUTO: 6.5 10E3/UL (ref 4–11)

## 2024-07-31 PROCEDURE — 85025 COMPLETE CBC W/AUTO DIFF WBC: CPT

## 2024-07-31 PROCEDURE — 36415 COLL VENOUS BLD VENIPUNCTURE: CPT
